# Patient Record
(demographics unavailable — no encounter records)

---

## 2025-04-02 NOTE — PLAN
[FreeTextEntry1] : Pap smear RTC for Nexplanon removal and new implant per pt's decision  Self-breast exams advised and discussed with pt Role of diet and exercise advised in maintaining healthy weight and BMI  Foods rich in calcium and vitamin D as well as weight bearing exercised for bone health discussed with pt Following Primary care for health maintenance advised

## 2025-04-02 NOTE — HISTORY OF PRESENT ILLNESS
[N] : Patient is not sexually active [Y] : Positive pregnancy history [Menarche Age: ____] : age at menarche was [unfilled] [Previously active] : previously active [Men] : men [No] : No [Mammogramdate] : n/a [PapSmeardate] : 11/14/2018 [BreastSonogramDate] : n/a [TextBox_31] : NEG  [ColonoscopyDate] : n/a [HIVDate] : 06/12/2019 [TextBox_53] : neg  [GonorrheaDate] : 12/20/2019 [TextBox_63] : neg  [ChlamydiaDate] : 12/20/2019 [TextBox_68] : neg  [TrichomonasDate] : 04/13/2019 [TextBox_73] : neg  [HPVDate] : N/A [LMPDate] : 03/08/2025 [PGHxTotal] : 3 [[facility] [[facility]] : 2 [FreeTextEntry1] : 03/08/2025 [FreeTextEntry2] : single

## 2025-04-02 NOTE — PHYSICAL EXAM
[MA] : MA [FreeTextEntry2] : Cassandra  [Appropriately responsive] : appropriately responsive [Alert] : alert [No Acute Distress] : no acute distress [Soft] : soft [Non-tender] : non-tender [Non-distended] : non-distended [No HSM] : No HSM [No Lesions] : no lesions [No Mass] : no mass [Oriented x3] : oriented x3 [Examination Of The Breasts] : a normal appearance [No Masses] : no breast masses were palpable [Labia Majora] : normal [Labia Minora] : normal [Normal] : normal [Uterine Adnexae] : normal

## 2025-04-09 NOTE — PLAN
[FreeTextEntry1] : HPI   prior placed 5 yrs   nexplanon removal and re-insertion  LMP:   Last pap: Last MMG:   Contraception:   --------------------------------------------------------------------------------------------------------- ASSESSMENT & PLAN:    ucg neg    rto   Dr. Shirin Erwin DO, MPH, FACOG

## 2025-04-09 NOTE — PLAN
[FreeTextEntry1] : HPI   prior placed 5 yrs   nexplanon removal and re-insertion  LMP:   Last pap: Last MMG:   Contraception:   --------------------------------------------------------------------------------------------------------- ASSESSMENT & PLAN:    ucg neg    rto   Dr. Shirin Erwni DO, MPH, FACOG